# Patient Record
Sex: FEMALE | NOT HISPANIC OR LATINO | Employment: PART TIME | URBAN - METROPOLITAN AREA
[De-identification: names, ages, dates, MRNs, and addresses within clinical notes are randomized per-mention and may not be internally consistent; named-entity substitution may affect disease eponyms.]

---

## 2017-03-18 ENCOUNTER — OFFICE VISIT (OUTPATIENT)
Dept: RETAIL CLINIC | Facility: CLINIC | Age: 22
End: 2017-03-18

## 2017-03-18 VITALS
OXYGEN SATURATION: 97 % | HEART RATE: 96 BPM | DIASTOLIC BLOOD PRESSURE: 72 MMHG | SYSTOLIC BLOOD PRESSURE: 124 MMHG | RESPIRATION RATE: 18 BRPM | TEMPERATURE: 98.6 F

## 2017-03-18 DIAGNOSIS — H66.011 ACUTE SUPPURATIVE OTITIS MEDIA OF RIGHT EAR WITH SPONTANEOUS RUPTURE OF TYMPANIC MEMBRANE, RECURRENCE NOT SPECIFIED: Primary | ICD-10-CM

## 2017-03-18 PROCEDURE — 99213 OFFICE O/P EST LOW 20 MIN: CPT | Performed by: NURSE PRACTITIONER

## 2017-03-18 RX ORDER — CIPROFLOXACIN AND DEXAMETHASONE 3; 1 MG/ML; MG/ML
4 SUSPENSION/ DROPS AURICULAR (OTIC) 2 TIMES DAILY
Qty: 7.5 ML | Refills: 0 | Status: SHIPPED | OUTPATIENT
Start: 2017-03-18 | End: 2017-03-25

## 2017-03-18 RX ORDER — AMOXICILLIN 875 MG/1
875 TABLET, COATED ORAL 2 TIMES DAILY
Qty: 20 TABLET | Refills: 0 | Status: SHIPPED | OUTPATIENT
Start: 2017-03-18 | End: 2017-03-28

## 2017-03-18 NOTE — PROGRESS NOTES
Subjective   Patient ID: Shanthi Workman is a 21 y.o. female presents with   Chief Complaint   Patient presents with   • Earache       Earache    There is pain in the right ear. This is a new problem. The current episode started yesterday. The problem occurs constantly. The problem has been gradually worsening. There has been no fever. The pain is severe. Associated symptoms include ear discharge and hearing loss (mild). Pertinent negatives include no diarrhea, headaches, rhinorrhea, sore throat or vomiting. She has tried ear drops (made it worse) for the symptoms. The treatment provided no relief. Her past medical history is significant for a chronic ear infection, hearing loss and a tympanostomy tube.       No Known Allergies    The following portions of the patient's history were reviewed and updated as appropriate: allergies, current medications, past family history, past medical history, past social history, past surgical history and problem list.      Review of Systems   Constitutional: Negative for chills, diaphoresis, fatigue and fever.   HENT: Positive for ear discharge, ear pain and hearing loss (mild). Negative for congestion, postnasal drip, rhinorrhea, sinus pressure, sneezing and sore throat.    Respiratory: Negative for shortness of breath.    Gastrointestinal: Negative for diarrhea, nausea and vomiting.   Neurological: Negative for headaches.       Objective     Vitals:    03/18/17 1706   BP: 124/72   Pulse: 96   Resp: 18   Temp: 98.6 °F (37 °C)   SpO2: 97%         Physical Exam   Constitutional: She is oriented to person, place, and time. She appears well-developed and well-nourished. She does not appear ill. No distress.   HENT:   Head: Normocephalic.   Right Ear: External ear and ear canal normal. There is drainage (yellow). Tympanic membrane is perforated, erythematous and bulging. Decreased hearing (mild) is noted.   Left Ear: Hearing, external ear and ear canal normal. Tympanic membrane is  scarred.   Nose: Nose normal. No mucosal edema, rhinorrhea or sinus tenderness.   Mouth/Throat: Oropharynx is clear and moist and mucous membranes are normal. Tonsils are 2+ on the right. Tonsils are 2+ on the left. No tonsillar exudate.   Eyes: Conjunctivae are normal.   Sclera white.   Neck: No tracheal deviation present.   Cardiovascular: Normal rate, regular rhythm, S1 normal, S2 normal and normal heart sounds.    Pulmonary/Chest: Effort normal and breath sounds normal. No accessory muscle usage. No respiratory distress.   Abdominal: Soft. Bowel sounds are normal. There is no tenderness.   Lymphadenopathy:     She has no cervical adenopathy.   Neurological: She is alert and oriented to person, place, and time.   Skin: Skin is warm and dry.   Vitals reviewed.        Shanthi was seen today for earache.    Diagnoses and all orders for this visit:    Acute suppurative otitis media of right ear with spontaneous rupture of tympanic membrane, recurrence not specified  -     ciprofloxacin-dexamethasone (CIPRODEX) 0.3-0.1 % otic suspension; Administer 4 drops to the right ear 2 (Two) Times a Day for 7 days.  -     amoxicillin (AMOXIL) 875 MG tablet; Take 1 tablet by mouth 2 (Two) Times a Day for 10 days.        Follow-up with Primary Care Physician in 48-72 hours if these symptoms worsen or fail to improve as anticipated. Patient verbalizes understanding.

## 2017-03-18 NOTE — PATIENT INSTRUCTIONS

## 2018-01-04 ENCOUNTER — OFFICE VISIT (OUTPATIENT)
Dept: RETAIL CLINIC | Facility: CLINIC | Age: 23
End: 2018-01-04

## 2018-01-04 VITALS
OXYGEN SATURATION: 96 % | SYSTOLIC BLOOD PRESSURE: 104 MMHG | HEART RATE: 87 BPM | TEMPERATURE: 97.9 F | DIASTOLIC BLOOD PRESSURE: 70 MMHG | RESPIRATION RATE: 16 BRPM

## 2018-01-04 DIAGNOSIS — J02.9 ACUTE PHARYNGITIS, UNSPECIFIED ETIOLOGY: Primary | ICD-10-CM

## 2018-01-04 DIAGNOSIS — B27.90 INFECTIOUS MONONUCLEOSIS WITHOUT COMPLICATION, INFECTIOUS MONONUCLEOSIS DUE TO UNSPECIFIED ORGANISM: ICD-10-CM

## 2018-01-04 LAB
EXPIRATION DATE: ABNORMAL
EXPIRATION DATE: NORMAL
HETEROPH AB SER QL LA: POSITIVE
INTERNAL CONTROL: ABNORMAL
INTERNAL CONTROL: NORMAL
Lab: ABNORMAL
Lab: NORMAL
S PYO AG THROAT QL: NEGATIVE

## 2018-01-04 PROCEDURE — 87880 STREP A ASSAY W/OPTIC: CPT | Performed by: NURSE PRACTITIONER

## 2018-01-04 PROCEDURE — 86308 HETEROPHILE ANTIBODY SCREEN: CPT | Performed by: NURSE PRACTITIONER

## 2018-01-04 PROCEDURE — 99213 OFFICE O/P EST LOW 20 MIN: CPT | Performed by: NURSE PRACTITIONER

## 2018-01-04 RX ORDER — AZITHROMYCIN 250 MG/1
TABLET, FILM COATED ORAL
Qty: 6 TABLET | Refills: 0 | Status: SHIPPED | OUTPATIENT
Start: 2018-01-04

## 2018-01-04 RX ORDER — DEXTROAMPHETAMINE SACCHARATE, AMPHETAMINE ASPARTATE, DEXTROAMPHETAMINE SULFATE AND AMPHETAMINE SULFATE 2.5; 2.5; 2.5; 2.5 MG/1; MG/1; MG/1; MG/1
10 TABLET ORAL DAILY
COMMUNITY

## 2018-01-04 NOTE — PROGRESS NOTES
Subjective   Patient ID: Shanthi Ford is a 22 y.o. female presents with   Chief Complaint   Patient presents with   • Sore Throat       Sore Throat    This is a new problem. The current episode started in the past 7 days (5 days). The problem has been unchanged. Neither side of throat is experiencing more pain than the other. The maximum temperature recorded prior to her arrival was 100.4 - 100.9 F. The fever has been present for 3 to 4 days. Associated symptoms include congestion, ear pain, headaches and swollen glands. Pertinent negatives include no abdominal pain, coughing, diarrhea, drooling, ear discharge, hoarse voice, plugged ear sensation, neck pain, shortness of breath, stridor, trouble swallowing or vomiting. She has had no exposure to strep or mono. She has tried nothing for the symptoms.       No Known Allergies    The following portions of the patient's history were reviewed and updated as appropriate: allergies, current medications, past family history, past medical history, past social history, past surgical history and problem list.      Review of Systems   Constitutional: Positive for chills, fatigue and fever.   HENT: Positive for congestion, ear pain, rhinorrhea and sore throat. Negative for drooling, ear discharge, hoarse voice, postnasal drip, sinus pain, sinus pressure, sneezing and trouble swallowing.    Respiratory: Negative for cough, chest tightness, shortness of breath, wheezing and stridor.    Cardiovascular: Negative for chest pain.   Gastrointestinal: Negative for abdominal pain, diarrhea, nausea and vomiting.   Musculoskeletal: Negative for neck pain.   Neurological: Positive for headaches.       Objective     Vitals:    01/04/18 0928   BP: 104/70   Pulse: 87   Resp: 16   Temp: 97.9 °F (36.6 °C)   SpO2: 96%         Physical Exam   Constitutional: She is oriented to person, place, and time. She appears well-developed and well-nourished. She does not appear ill. No distress.    HENT:   Head: Normocephalic.   Right Ear: Hearing, tympanic membrane, external ear and ear canal normal.   Left Ear: Hearing, tympanic membrane, external ear and ear canal normal.   Nose: Nose normal. No rhinorrhea or sinus tenderness.   Mouth/Throat: Uvula is midline and mucous membranes are normal. No trismus in the jaw. No uvula swelling. Posterior oropharyngeal erythema present. Tonsils are 2+ on the right. Tonsils are 2+ on the left. No tonsillar exudate.   Eyes: Conjunctivae are normal.   Sclera white.   Neck: Trachea normal and phonation normal. No tracheal deviation present.   Cardiovascular: Normal rate, regular rhythm, S1 normal, S2 normal and normal heart sounds.    Pulmonary/Chest: Effort normal and breath sounds normal. No accessory muscle usage. No respiratory distress.   Abdominal: Soft. Bowel sounds are normal. There is no tenderness.   Lymphadenopathy:     She has cervical adenopathy.        Right cervical: Superficial cervical and posterior cervical adenopathy present.        Left cervical: Superficial cervical and posterior cervical adenopathy present.   Neurological: She is alert and oriented to person, place, and time.   Skin: Skin is warm and dry.   Vitals reviewed.        Shanthi was seen today for sore throat.    Diagnoses and all orders for this visit:    Acute pharyngitis, unspecified etiology  -     POC Rapid Strep A  -     Throat / Upper Respiratory Culture - Swab, Throat  -     POC Infectious Mononucleosis Antibody  -     azithromycin (ZITHROMAX Z-ANAMIKA) 250 MG tablet; Take 2 tablets the first day, then 1 tablet daily for 4 days.    Infectious mononucleosis without complication, infectious mononucleosis due to unspecified organism      Meds as prescribed.  Tylenol/ibuprofen for fever/pain.  Push fluids.  Await throat culture.  Follow up if no improvement or worsening.  Follow-up with Primary Care Physician in 48-72 hours if these symptoms worsen or fail to improve as anticipated. Patient  verbalizes understanding.

## 2018-01-04 NOTE — PATIENT INSTRUCTIONS
"Pharyngitis  Pharyngitis is a sore throat (pharynx). There is redness, pain, and swelling of your throat.  HOME CARE   · Drink enough fluids to keep your pee (urine) clear or pale yellow.  · Only take medicine as told by your doctor.  ¨ You may get sick again if you do not take medicine as told. Finish your medicines, even if you start to feel better.  ¨ Do not take aspirin.  · Rest.  · Rinse your mouth (gargle) with salt water (½ tsp of salt per 1 qt of water) every 1-2 hours. This will help the pain.  · If you are not at risk for choking, you can suck on hard candy or sore throat lozenges.  GET HELP IF:  · You have large, tender lumps on your neck.  · You have a rash.  · You cough up green, yellow-brown, or bloody spit.  GET HELP RIGHT AWAY IF:   · You have a stiff neck.  · You drool or cannot swallow liquids.  · You throw up (vomit) or are not able to keep medicine or liquids down.  · You have very bad pain that does not go away with medicine.  · You have problems breathing (not from a stuffy nose).  MAKE SURE YOU:   · Understand these instructions.  · Will watch your condition.  · Will get help right away if you are not doing well or get worse.     This information is not intended to replace advice given to you by your health care provider. Make sure you discuss any questions you have with your health care provider.     Document Released: 06/05/2009 Document Revised: 10/08/2014 Document Reviewed: 08/25/2014  [employer] Interactive Patient Education ©2017 [employer]    Meds as prescribed.  Tylenol/ibuprofen for fever/pain.  Push fluids.  Await throat culture.  Follow up if no improvement or worsening.    Infectious Mononucleosis  Infectious mononucleosis is an infection caused by a virus. This illness is often called \"mono.\" It causes symptoms that affect various areas of the body, including the throat, upper air passages, and lymph glands. The liver or spleen may also be affected.  The virus spreads from person " to person through close contact. The illness is usually not serious and often goes away in 2-4 weeks without treatment. In rare cases, symptoms can be more severe and last longer, sometimes up to several months. Because the illness can sometimes cause the liver or spleen to become enlarged, you should not participate in contact sports or strenuous exercise until your health care provider approves.  CAUSES   Infectious mononucleosis is caused by the Dayna-Barr virus. This virus spreads through contact with an infected person's saliva or other bodily fluids. It is often spread through kissing. It may also spread through coughing or sharing utensils or drinking glasses that were recently used by an infected person. An infected person will not always appear ill but can still spread the virus.  RISK FACTORS  This illness is most common in adolescents and young adults.  SIGNS AND SYMPTOMS   The most common symptoms of infectious mononucleosis are:  · Sore throat.    · Headache.    · Fatigue.    · Muscle aches.    · Swollen glands.    · Fever.    · Poor appetite.    · Enlarged liver or spleen.    Some less common symptoms that can also occur include:  · Rash. This is more common if you take antibiotic medicines.  · Feeling sick to your stomach (nauseous).    · Abdominal pain.    DIAGNOSIS   Your health care provider will take your medical history and do a physical exam. Blood tests can be done to confirm the diagnosis.   TREATMENT   Infectious mononucleosis usually goes away on its own with time. It cannot be cured with medicines, but medicines are sometimes used to relieve symptoms. Steroid medicine is sometimes needed if the swelling in the throat causes breathing or swallowing problems. Treatment in a hospital is sometimes needed for severe cases.   HOME CARE INSTRUCTIONS   · Rest as needed.    · Do not participate in contact sports, strenuous exercise, or heavy lifting until your health care provider approves. The  liver and spleen could be seriously injured if they are enlarged from the illness. You may need to wait a couple months before participating in sports.    · Drink enough fluid to keep your urine clear or pale yellow.    · Do not drink alcohol.  · Take medicines only as directed by your health care provider. Children under 18 years of age should not take aspirin because of the association with Reye syndrome.    · Eat soft foods. Cold foods such as ice cream or frozen ice pops can soothe a sore throat.  · If you have a sore throat, gargle with a mixture of salt and water. This may help relieve your discomfort. Mix 1 tsp of salt in 1 cup of warm water. Sucking on hard candy may also help.    · Start regular activities gradually after the fever is gone. Be sure to rest when tired.    · Avoid kissing or sharing utensils or drinking glasses until your health care provider tells you that you are no longer contagious.    PREVENTION   To avoid spreading the virus, do not kiss anyone or share utensils, drinking glasses, or food until your health care provider tells you that you are no longer contagious.  SEEK MEDICAL CARE IF:   · Your fever is not gone after 10 days.  · You have swollen lymph nodes that are not back to normal after 4 weeks.  · Your activity level is not back to normal after 2 months.    · You have yellow coloring to your eyes and skin (jaundice).  · You have constipation.    SEEK IMMEDIATE MEDICAL CARE IF:   · You have severe pain in the abdomen or shoulder.  · You are drooling.  · You have trouble swallowing.  · You have trouble breathing.  · You develop a stiff neck.  · You develop a severe headache.  · You cannot stop throwing up (vomiting).  · You have convulsions.  · You are confused.  · You have trouble with balance.  · You have signs of dehydration. These may include:    Weakness.    Sunken eyes.    Pale skin.    Dry mouth.    Rapid breathing or pulse.     This information is not intended to replace  advice given to you by your health care provider. Make sure you discuss any questions you have with your health care provider.     Document Released: 12/15/2001 Document Revised: 01/08/2016 Document Reviewed: 08/25/2015  [employer] Interactive Patient Education ©2017 [employer]

## 2018-01-05 ENCOUNTER — TELEPHONE (OUTPATIENT)
Dept: RETAIL CLINIC | Facility: CLINIC | Age: 23
End: 2018-01-05

## 2018-01-11 ENCOUNTER — TELEPHONE (OUTPATIENT)
Dept: RETAIL CLINIC | Facility: CLINIC | Age: 23
End: 2018-01-11

## 2018-01-12 ENCOUNTER — TELEPHONE (OUTPATIENT)
Dept: RETAIL CLINIC | Facility: CLINIC | Age: 23
End: 2018-01-12

## 2018-04-13 ENCOUNTER — OFFICE VISIT CONVERTED (OUTPATIENT)
Dept: FAMILY MEDICINE CLINIC | Age: 23
End: 2018-04-13
Attending: FAMILY MEDICINE

## 2019-08-14 ENCOUNTER — LAB REQUISITION (OUTPATIENT)
Dept: LAB | Facility: HOSPITAL | Age: 24
End: 2019-08-14

## 2019-08-14 DIAGNOSIS — Z00.00 ENCOUNTER FOR GENERAL ADULT MEDICAL EXAMINATION WITHOUT ABNORMAL FINDINGS: ICD-10-CM

## 2019-08-14 PROCEDURE — 87075 CULTR BACTERIA EXCEPT BLOOD: CPT | Performed by: OTOLARYNGOLOGY

## 2019-08-14 PROCEDURE — 87102 FUNGUS ISOLATION CULTURE: CPT | Performed by: OTOLARYNGOLOGY

## 2019-08-14 PROCEDURE — 87205 SMEAR GRAM STAIN: CPT | Performed by: OTOLARYNGOLOGY

## 2019-08-14 PROCEDURE — 87070 CULTURE OTHR SPECIMN AEROBIC: CPT | Performed by: OTOLARYNGOLOGY

## 2019-08-17 LAB
BACTERIA SPEC AEROBE CULT: NORMAL
GRAM STN SPEC: NORMAL

## 2019-08-19 LAB — BACTERIA SPEC ANAEROBE CULT: NORMAL

## 2019-09-11 LAB — FUNGUS WND CULT: NORMAL

## 2019-10-01 ENCOUNTER — OFFICE VISIT (OUTPATIENT)
Dept: RETAIL CLINIC | Facility: CLINIC | Age: 24
End: 2019-10-01

## 2019-10-01 VITALS
SYSTOLIC BLOOD PRESSURE: 108 MMHG | OXYGEN SATURATION: 98 % | DIASTOLIC BLOOD PRESSURE: 70 MMHG | HEART RATE: 81 BPM | RESPIRATION RATE: 18 BRPM | TEMPERATURE: 98.8 F

## 2019-10-01 DIAGNOSIS — A08.4 VIRAL GASTROENTERITIS: Primary | ICD-10-CM

## 2019-10-01 PROCEDURE — 99213 OFFICE O/P EST LOW 20 MIN: CPT | Performed by: NURSE PRACTITIONER

## 2019-10-01 RX ORDER — OMEPRAZOLE 20 MG/1
20 CAPSULE, DELAYED RELEASE ORAL DAILY
COMMUNITY

## 2019-10-01 RX ORDER — ESCITALOPRAM OXALATE 20 MG/1
20 TABLET ORAL DAILY
COMMUNITY

## 2019-10-01 NOTE — PATIENT INSTRUCTIONS
Viral Gastroenteritis, Adult    Viral gastroenteritis is also known as the stomach flu. This condition is caused by various viruses. These viruses can be passed from person to person very easily (are very contagious). This condition may affect your stomach, small intestine, and large intestine. It can cause sudden watery diarrhea, fever, and vomiting.  Diarrhea and vomiting can make you feel weak and cause you to become dehydrated. You may not be able to keep fluids down. Dehydration can make you tired and thirsty, cause you to have a dry mouth, and decrease how often you urinate. Older adults and people with other diseases or a weak immune system are at higher risk for dehydration.  It is important to replace the fluids that you lose from diarrhea and vomiting. If you become severely dehydrated, you may need to get fluids through an IV tube.  What are the causes?  Gastroenteritis is caused by various viruses, including rotavirus and norovirus. Norovirus is the most common cause in adults.  You can get sick by eating food, drinking water, or touching a surface contaminated with one of these viruses. You can also get sick from sharing utensils or other personal items with an infected person.  What increases the risk?  This condition is more likely to develop in people:  · Who have a weak defense system (immune system).  · Who live with one or more children who are younger than 2 years old.  · Who live in a nursing home.  · Who go on cruise ships.  What are the signs or symptoms?  Symptoms of this condition start suddenly 1-2 days after exposure to a virus. Symptoms may last a few days or as long as a week. The most common symptoms are watery diarrhea and vomiting. Other symptoms include:  · Fever.  · Headache.  · Fatigue.  · Pain in the abdomen.  · Chills.  · Weakness.  · Nausea.  · Muscle aches.  · Loss of appetite.  How is this diagnosed?  This condition is diagnosed with a medical history and physical exam. You  may also have a stool test to check for viruses or other infections.  How is this treated?  This condition typically goes away on its own. The focus of treatment is to restore lost fluids (rehydration). Your health care provider may recommend that you take an oral rehydration solution (ORS) to replace important salts and minerals (electrolytes) in your body. Severe cases of this condition may require giving fluids through an IV tube.  Treatment may also include medicine to help with your symptoms.  Follow these instructions at home:    Follow instructions from your health care provider about how to care for yourself at home.  Follow these recommendations as told by your health care provider:  · Take an ORS. This is a drink that is sold at pharmacies and retail stores.  · Drink clear fluids in small amounts as you are able. Clear fluids include water, ice chips, diluted fruit juice, and low-calorie sports drinks.  · Eat bland, easy-to-digest foods in small amounts as you are able. These foods include bananas, applesauce, rice, lean meats, toast, and crackers.  · Avoid fluids that contain a lot of sugar or caffeine, such as energy drinks, sports drinks, and soda.  · Avoid alcohol.  · Avoid spicy or fatty foods.  General instructions  · Drink enough fluid to keep your urine clear or pale yellow.  · Wash your hands often. If soap and water are not available, use hand .  · Make sure that all people in your household wash their hands well and often.  · Take over-the-counter and prescription medicines only as told by your health care provider.  · Rest at home while you recover.  · Watch your condition for any changes.  · Take a warm bath to relieve any burning or pain from frequent diarrhea episodes.  · Keep all follow-up visits as told by your health care provider. This is important.  Contact a health care provider if:  · You cannot keep fluids down.  · Your symptoms get worse.  · You have new symptoms.  · You  feel light-headed or dizzy.  · You have muscle cramps.  Get help right away if:  · You have chest pain.  · You feel extremely weak or you faint.  · You see blood in your vomit.  · Your vomit looks like coffee grounds.  · You have bloody or black stools or stools that look like tar.  · You have a severe headache, a stiff neck, or both.  · You have a rash.  · You have severe pain, cramping, or bloating in your abdomen.  · You have trouble breathing or you are breathing very quickly.  · Your heart is beating very quickly.  · Your skin feels cold and clammy.  · You feel confused.  · You have pain when you urinate.  · You have signs of dehydration, such as:  ? Dark urine, very little urine, or no urine.  ? Cracked lips.  ? Dry mouth.  ? Sunken eyes.  ? Sleepiness.  ? Weakness.  This information is not intended to replace advice given to you by your health care provider. Make sure you discuss any questions you have with your health care provider.  Document Released: 12/18/2006 Document Revised: 08/02/2018 Document Reviewed: 08/23/2016  [employer] Interactive Patient Education © 2019 [employer]

## 2019-10-01 NOTE — PROGRESS NOTES
Subjective   Shanthi Ford is a 24 y.o. female.     Diarrhea    This is a new problem. The current episode started yesterday. The problem has been gradually improving. The stool consistency is described as watery. The patient states that diarrhea awakens her from sleep. Associated symptoms include chills. Pertinent negatives include no abdominal pain, fever, headaches, sweats or vomiting. Nothing aggravates the symptoms. There are no known risk factors. She has tried nothing for the symptoms. The treatment provided no relief. There is no history of bowel resection or malabsorption.    Pt states that she never vomited, however was nauseated and has had diarrhea.  Pt is able to hold liquids down and ate lunch today.  Pt states she is gradually improving.    The following portions of the patient's history were reviewed and updated as appropriate: allergies, current medications, past family history, past medical history, past social history, past surgical history and problem list.    Review of Systems   Constitutional: Positive for chills and fatigue. Negative for fever.   HENT: Negative.    Respiratory: Negative.    Cardiovascular: Negative.    Gastrointestinal: Positive for diarrhea and nausea. Negative for abdominal pain and vomiting.       Objective   Physical Exam   Constitutional: She is oriented to person, place, and time. She appears well-developed.   HENT:   Head: Normocephalic.   Right Ear: Tympanic membrane and ear canal normal.   Left Ear: Tympanic membrane and ear canal normal.   Nose: Nose normal.   Mouth/Throat: Uvula is midline, oropharynx is clear and moist and mucous membranes are normal. No tonsillar exudate.   Cardiovascular: Normal rate, regular rhythm and normal heart sounds.   Pulmonary/Chest: Effort normal and breath sounds normal.   Abdominal: Soft. Normal appearance. Bowel sounds are decreased. There is no tenderness.   Neurological: She is alert and oriented to person, place, and time.    Skin: Skin is warm, dry and intact.   Psychiatric: She has a normal mood and affect.     Vitals:    10/01/19 1655   BP: 108/70   Pulse: 81   Resp: 18   Temp: 98.8 °F (37.1 °C)   TempSrc: Oral   SpO2: 98%         Assessment/Plan   Diagnoses and all orders for this visit:    Viral gastroenteritis      Pt to follow up with PCP if symptoms worsen or fail to improve.    Viral Gastroenteritis, Adult    Viral gastroenteritis is also known as the stomach flu. This condition is caused by various viruses. These viruses can be passed from person to person very easily (are very contagious). This condition may affect your stomach, small intestine, and large intestine. It can cause sudden watery diarrhea, fever, and vomiting.  Diarrhea and vomiting can make you feel weak and cause you to become dehydrated. You may not be able to keep fluids down. Dehydration can make you tired and thirsty, cause you to have a dry mouth, and decrease how often you urinate. Older adults and people with other diseases or a weak immune system are at higher risk for dehydration.  It is important to replace the fluids that you lose from diarrhea and vomiting. If you become severely dehydrated, you may need to get fluids through an IV tube.  What are the causes?  Gastroenteritis is caused by various viruses, including rotavirus and norovirus. Norovirus is the most common cause in adults.  You can get sick by eating food, drinking water, or touching a surface contaminated with one of these viruses. You can also get sick from sharing utensils or other personal items with an infected person.  What increases the risk?  This condition is more likely to develop in people:  · Who have a weak defense system (immune system).  · Who live with one or more children who are younger than 2 years old.  · Who live in a nursing home.  · Who go on cruise ships.  What are the signs or symptoms?  Symptoms of this condition start suddenly 1-2 days after exposure to a  virus. Symptoms may last a few days or as long as a week. The most common symptoms are watery diarrhea and vomiting. Other symptoms include:  · Fever.  · Headache.  · Fatigue.  · Pain in the abdomen.  · Chills.  · Weakness.  · Nausea.  · Muscle aches.  · Loss of appetite.  How is this diagnosed?  This condition is diagnosed with a medical history and physical exam. You may also have a stool test to check for viruses or other infections.  How is this treated?  This condition typically goes away on its own. The focus of treatment is to restore lost fluids (rehydration). Your health care provider may recommend that you take an oral rehydration solution (ORS) to replace important salts and minerals (electrolytes) in your body. Severe cases of this condition may require giving fluids through an IV tube.  Treatment may also include medicine to help with your symptoms.  Follow these instructions at home:    Follow instructions from your health care provider about how to care for yourself at home.  Follow these recommendations as told by your health care provider:  · Take an ORS. This is a drink that is sold at pharmacies and retail stores.  · Drink clear fluids in small amounts as you are able. Clear fluids include water, ice chips, diluted fruit juice, and low-calorie sports drinks.  · Eat bland, easy-to-digest foods in small amounts as you are able. These foods include bananas, applesauce, rice, lean meats, toast, and crackers.  · Avoid fluids that contain a lot of sugar or caffeine, such as energy drinks, sports drinks, and soda.  · Avoid alcohol.  · Avoid spicy or fatty foods.  General instructions  · Drink enough fluid to keep your urine clear or pale yellow.  · Wash your hands often. If soap and water are not available, use hand .  · Make sure that all people in your household wash their hands well and often.  · Take over-the-counter and prescription medicines only as told by your health care  provider.  · Rest at home while you recover.  · Watch your condition for any changes.  · Take a warm bath to relieve any burning or pain from frequent diarrhea episodes.  · Keep all follow-up visits as told by your health care provider. This is important.  Contact a health care provider if:  · You cannot keep fluids down.  · Your symptoms get worse.  · You have new symptoms.  · You feel light-headed or dizzy.  · You have muscle cramps.  Get help right away if:  · You have chest pain.  · You feel extremely weak or you faint.  · You see blood in your vomit.  · Your vomit looks like coffee grounds.  · You have bloody or black stools or stools that look like tar.  · You have a severe headache, a stiff neck, or both.  · You have a rash.  · You have severe pain, cramping, or bloating in your abdomen.  · You have trouble breathing or you are breathing very quickly.  · Your heart is beating very quickly.  · Your skin feels cold and clammy.  · You feel confused.  · You have pain when you urinate.  · You have signs of dehydration, such as:  ? Dark urine, very little urine, or no urine.  ? Cracked lips.  ? Dry mouth.  ? Sunken eyes.  ? Sleepiness.  ? Weakness.  This information is not intended to replace advice given to you by your health care provider. Make sure you discuss any questions you have with your health care provider.  Document Released: 12/18/2006 Document Revised: 08/02/2018 Document Reviewed: 08/23/2016  [employer] Patient Education © 2019 [employer]

## 2021-05-18 NOTE — PROGRESS NOTES
Shanthi Ford 1995     Office/Outpatient Visit    Visit Date:  03:41 pm    Provider: Nolan Damon MD (Assistant: Amanda Ignacio RN)    Location: [facility]        Electronically signed by Nolan Damon MD on  2018 06:10:46 PM                             SUBJECTIVE:        CC:     Ms. Ford is a 22 year old White female.  This is her first visit to the clinic.  Establish New PCP;         HPI:         Patient to be evaluated for aDD.  Symptoms include easy distractability, inability to complete tasks, and difficulty sustaining attention.  STATES SHE WAS DIAGNOSED AS AN ADULT BY A PSYCHOLOGIST.  HER PCP HAS BEEN PRESCRIBING HER ADDERALL BUT IT IS NO LONGER WORKING.  A DIFFERENT PROVIDER TRIED VYVANSE BUT IT WAS TOO EXPENSIVE.          Concerning acquired hypothyroidism, this was first diagnosed several years ago.  She is currently taking Levothyroid.  She cannot remember when a TSH was last checked.      ROS:     CONSTITUTIONAL:  Negative for chills and fever.      E/N/T:  Negative for ear pain, nasal congestion and sore throat.      CARDIOVASCULAR:  Negative for chest pain and palpitations.      RESPIRATORY:  Negative for recent cough and dyspnea.      GASTROINTESTINAL:  Negative for abdominal pain, nausea and vomiting.      NEUROLOGICAL:  Negative for headaches.      PSYCHIATRIC:  Positive for anxiety and beba depression ( (FOR QUITE SOME TIME.  ZOLOFT HELPED BUT TOO EXPENSIVE.) ).          PMH/FMH/SH:     Last Reviewed on 2018 05:55 PM by Nolan Damon    Past Medical History:                 PAST MEDICAL HISTORY             GYNECOLOGICAL HISTORY:             CURRENT MEDICAL PROVIDERS:    Obstetrician/Gynecologist         Surgical History:         Pressure Equalization Tubes    Tonsillectomy/Adenoidectomy         Family History:     Father: Seizure Disorder         Social History:     Occupation: MEDICAL ASSISTANT;     Marital Status:           Tobacco/Alcohol/Supplements:     Last Reviewed on 4/14/2018 05:55 PM by Nolan Damon    Tobacco: She has never smoked.          Alcohol: Frequency:    OCCASIONALLY;     Caffeine:  She admits to consuming caffeine via tea ( 2 servings per day ) and soda ( 2 servings per day ).          Substance Abuse History:     Last Reviewed on 4/14/2018 05:55 PM by Nolan Damon    None             Current Problems:     Last Reviewed on 4/14/2018 05:57 PM by Nolan Damon    Acquired hypothyroidism     ADD         Immunizations:     Fluzone (3 + years dose) 10/1/2017         Allergies:     Last Reviewed on 4/14/2018 05:56 PM by Nolan Damon      No Known Drug Allergies.         Current Medications:     Last Reviewed on 4/14/2018 05:56 PM by Nolan Damon    Adderall 15mg Tablet Take 1 tablet(s) by mouth bid     hair, skin and nail vitamin     Levothyroxine Sodium 0.05mg Tablet 1 tab daily         OBJECTIVE:        Vitals:         Current: 4/13/2018 3:44:08 PM    Ht:  5 ft, 5 in;  Wt: 260.9 lbs;  BMI: 43.4    T: 98.6 F (oral);  BP: 130/74 mm Hg (left arm, sitting);  P: 81 bpm (left arm (BP Cuff), sitting)        Exams:     PHYSICAL EXAM:     GENERAL: vital signs recorded - well developed, well nourished;  no apparent distress;     NECK: trachea is midline; thyroid is non-palpable;     RESPIRATORY: normal respiratory rate and pattern with no distress; normal breath sounds with no rales, rhonchi, wheezes or rubs;     CARDIOVASCULAR: normal rate; rhythm is regular;     GASTROINTESTINAL: nontender; normal bowel sounds; no organomegaly;     LYMPHATIC: no enlargement of cervical or facial nodes;     NEUROLOGIC: GROSSLY INTACT     PSYCHIATRIC:  appropriate affect and demeanor; normal speech pattern; grossly normal memory;         Lab/Test Results:         LABORATORY RESULTS:     STATES SHE HAS NOT HAD LABS [facility] FOR A WHILE;         ASSESSMENT           314.00   F90.0   ADD              DDx:     244.8   E03.8  Acquired hypothyroidism              DDx:     300.4   F41.1  Anxiety with depression              DDx:         PLAN:          ADD         REFERRALS:  Referral initiated to a psychologist ( at [facility] ).      FOLLOW-UP: Schedule a follow-up visit in 2 months. LABS AT NEXT VISIT ADVISED I AM NOT COMFORTABLE PRESCRIBING STIMULANTS WITHOUT THE GUIDANCE OF A MENTAL HEALTH PROVIDER          Acquired hypothyroidism         MEDICATIONS: (no change to current medication regimen)          Anxiety with depression         DECLINES MEDS TODAY AS ABOVE             Patient Recommendations:        For  ADD:     Schedule a follow-up visit in 2 months.          For  Anxiety with depression:     I also recommend DECLINES MEDS TODAY.              CHARGE CAPTURE           **Please note: ICD descriptions below are intended for billing purposes only and may not represent clinical diagnoses**        Primary Diagnosis:         314.00 ADD            F90.0    Attention-deficit hyperactivity disorder, predominantly inattentive type              Orders:          89403   Office visit - new pt, level 3  (In-House)           244.8 Acquired hypothyroidism            E03.8    Other specified hypothyroidism    300.4 Anxiety with depression            F41.1    Generalized anxiety disorder        ADDENDUMS:      ____________________________________    Addendum: 04/16/2018 11:22 AM -          Visit Note Faxed to:        User Entered Recipient; Number [phone]

## 2021-07-01 VITALS
HEART RATE: 81 BPM | BODY MASS INDEX: 43.47 KG/M2 | DIASTOLIC BLOOD PRESSURE: 74 MMHG | WEIGHT: 260.9 LBS | TEMPERATURE: 98.6 F | HEIGHT: 65 IN | SYSTOLIC BLOOD PRESSURE: 130 MMHG